# Patient Record
Sex: MALE | Race: WHITE | URBAN - METROPOLITAN AREA
[De-identification: names, ages, dates, MRNs, and addresses within clinical notes are randomized per-mention and may not be internally consistent; named-entity substitution may affect disease eponyms.]

---

## 2024-11-22 ENCOUNTER — OFFICE VISIT (OUTPATIENT)
Dept: FAMILY MEDICINE CLINIC | Facility: CLINIC | Age: 69
End: 2024-11-22
Payer: COMMERCIAL

## 2024-11-22 VITALS
HEART RATE: 63 BPM | WEIGHT: 195 LBS | OXYGEN SATURATION: 98 % | HEIGHT: 71 IN | DIASTOLIC BLOOD PRESSURE: 74 MMHG | SYSTOLIC BLOOD PRESSURE: 150 MMHG | TEMPERATURE: 97 F | BODY MASS INDEX: 27.3 KG/M2 | RESPIRATION RATE: 18 BRPM

## 2024-11-22 DIAGNOSIS — M54.50 ACUTE LEFT-SIDED LOW BACK PAIN WITHOUT SCIATICA: Primary | ICD-10-CM

## 2024-11-22 PROCEDURE — 99202 OFFICE O/P NEW SF 15 MIN: CPT | Performed by: PHYSICIAN ASSISTANT

## 2024-11-22 PROCEDURE — 3078F DIAST BP <80 MM HG: CPT | Performed by: PHYSICIAN ASSISTANT

## 2024-11-22 PROCEDURE — 3008F BODY MASS INDEX DOCD: CPT | Performed by: PHYSICIAN ASSISTANT

## 2024-11-22 PROCEDURE — 3077F SYST BP >= 140 MM HG: CPT | Performed by: PHYSICIAN ASSISTANT

## 2024-11-22 NOTE — PROGRESS NOTES
CHIEF COMPLAINT:     Chief Complaint   Patient presents with    Back Pain     Lower back pain   Started Tuesday        HPI:     Dieudonne Sanchez is a 69 year old male who is here for complaints of predominantly left back pain and muscle spasms. Symptoms began 3 days ago.  He notes he had stomach flu prior to back pain onset.  GI symptoms have resolved.  He also noted some urinary symptoms of urinating frequently in the morning but now better.  No hematuria, or nausea/vomiting associated with urinary symptoms.  Lastly he noted the other night when trying to sleep his heart was pounding and he could feel pain into his back coincident with his heart beat.  Denies history of mechanical LBP other than once years ago.  No new activities, trauma or lifting events to explain his current back pain.  No LE symptoms.  He is using advil which provides relief.         No current outpatient medications on file.      No past medical history on file.   Social History:  Social History     Socioeconomic History    Marital status: Single        REVIEW OF SYSTEMS:     Positive for stated complaint: lower back pain.   Pertinent positives and negatives noted in the the HPI.      EXAM:   /74   Pulse 63   Temp 97.1 °F (36.2 °C)   Resp 18   Ht 5' 11\" (1.803 m)   Wt 195 lb (88.5 kg)   SpO2 98%   BMI 27.20 kg/m²    GENERAL: well developed, well nourished,in no apparent distress  SKIN: no rashes,no suspicious lesions  NECK: supple,no adenopathy,no bruits  LUNGS: clear to auscultation  CARDIO: S1/S2 RRR  GI:Soft, NT, ND, BS X4, no mass or organomegaly, no guarding or rebound.   No palpable abdominal aorta.  EXTREMITIES: no cyanosis, clubbing or edema  NEURO:  DTR's intact and equal bilaterally.  Sensation intact.  BACK: no tenderness to palpation or ROM          ASSESSMENT:   Dieudonne Sanchez is a 69 year old male who presents with complaints of   Encounter Diagnosis   Name Primary?    Acute left-sided low back pain without  sciatica Yes         PLAN:     I was unable to reproduce his symptoms today.  He has full painless lumbothoracic ROM. Discussed WIC limitation and limited diagnostic capacity.  I advised higher level of care to sort out his complaint since sources may include musculoskeletal, GI, Urological, cardiovascular..  In particular His complaint of back pain coincident with pounding heart raises concerns for AAA.  He will proceed to  ED for evaluation.       The patient indicates understanding of these issues and agrees to the plan.